# Patient Record
Sex: FEMALE
[De-identification: names, ages, dates, MRNs, and addresses within clinical notes are randomized per-mention and may not be internally consistent; named-entity substitution may affect disease eponyms.]

---

## 2018-11-20 ENCOUNTER — RECORD ABSTRACTING (OUTPATIENT)
Age: 68
End: 2018-11-20

## 2018-11-20 DIAGNOSIS — Z86.59 PERSONAL HISTORY OF OTHER MENTAL AND BEHAVIORAL DISORDERS: ICD-10-CM

## 2018-11-20 DIAGNOSIS — Z78.9 OTHER SPECIFIED HEALTH STATUS: ICD-10-CM

## 2018-11-20 DIAGNOSIS — K50.10 CROHN'S DISEASE OF LARGE INTESTINE W/OUT COMPLICATIONS: ICD-10-CM

## 2018-11-20 DIAGNOSIS — Z82.49 FAMILY HISTORY OF ISCHEMIC HEART DISEASE AND OTHER DISEASES OF THE CIRCULATORY SYSTEM: ICD-10-CM

## 2018-11-20 DIAGNOSIS — Z86.39 PERSONAL HISTORY OF OTHER ENDOCRINE, NUTRITIONAL AND METABOLIC DISEASE: ICD-10-CM

## 2018-11-20 DIAGNOSIS — Z87.891 PERSONAL HISTORY OF NICOTINE DEPENDENCE: ICD-10-CM

## 2018-11-20 PROBLEM — Z00.00 ENCOUNTER FOR PREVENTIVE HEALTH EXAMINATION: Status: ACTIVE | Noted: 2018-11-20

## 2018-12-03 ENCOUNTER — APPOINTMENT (OUTPATIENT)
Dept: GASTROENTEROLOGY | Facility: CLINIC | Age: 68
End: 2018-12-03
Payer: MEDICARE

## 2018-12-03 VITALS
SYSTOLIC BLOOD PRESSURE: 148 MMHG | WEIGHT: 120 LBS | BODY MASS INDEX: 20.49 KG/M2 | HEIGHT: 64 IN | HEART RATE: 70 BPM | DIASTOLIC BLOOD PRESSURE: 84 MMHG

## 2018-12-03 DIAGNOSIS — K50.90 CROHN'S DISEASE, UNSPECIFIED, W/OUT COMPLICATIONS: ICD-10-CM

## 2018-12-03 DIAGNOSIS — K60.3 ANAL FISTULA: ICD-10-CM

## 2018-12-03 DIAGNOSIS — K21.9 GASTRO-ESOPHAGEAL REFLUX DISEASE W/OUT ESOPHAGITIS: ICD-10-CM

## 2018-12-03 DIAGNOSIS — K59.00 CONSTIPATION, UNSPECIFIED: ICD-10-CM

## 2018-12-03 PROCEDURE — 99214 OFFICE O/P EST MOD 30 MIN: CPT

## 2018-12-18 ENCOUNTER — APPOINTMENT (OUTPATIENT)
Dept: GASTROENTEROLOGY | Facility: HOSPITAL | Age: 68
End: 2018-12-18

## 2018-12-20 ENCOUNTER — RX RENEWAL (OUTPATIENT)
Age: 68
End: 2018-12-20

## 2019-01-14 ENCOUNTER — RX CHANGE (OUTPATIENT)
Age: 69
End: 2019-01-14

## 2019-01-14 RX ORDER — MESALAMINE 1.2 G/1
1.2 TABLET, DELAYED RELEASE ORAL DAILY
Qty: 60 | Refills: 6 | Status: ACTIVE | COMMUNITY
Start: 2019-01-14 | End: 1900-01-01

## 2019-01-15 ENCOUNTER — RX RENEWAL (OUTPATIENT)
Age: 69
End: 2019-01-15

## 2019-02-19 ENCOUNTER — MEDICATION RENEWAL (OUTPATIENT)
Age: 69
End: 2019-02-19

## 2019-06-13 NOTE — HISTORY OF PRESENT ILLNESS
[de-identified] :  The patient presents for follow up for Crohns disease. EGD / Colonoscopy 12/2018 notable for  a 2 cm HH / mild esophagitis / mild chronic inflammation from terminal ileum to rectum. Anal canal scarring evident secondary to numerous anal fistula surgeries.  Colonoscopy was 12/2016  and was notable for diverticulosis, internal / external hemorrhoids and a benign colon polyp. The biopsies of normal appearing mucosa revealed mild crypt abscess. Course has been complicated by anal fistulas. Last surgery for fistula was in 2016. Additionally c/o long standing reflux ( ~ 2-3 times weekly). denies nausea, vomiting, fever, chills, diarrhea, melena, hematemesis. Describes chronic constipation despite water and fiber daily. alleviated with Miralax Diagnosed at age 14 by Dr. Coleman...apparently quite ill between age 14 and 18....no GI follow up between 18 and 32...Saw various GIs in 30's and 40s. Had colonoscopy by Dr. Matta in 1995 . Colonoscopy in 2014 notable for hemorrhoids and mild chronic inflammarory changes. Colonoscopy in 2012 was similar except for more active inflammatory changers in rectum associated with fistula tract. Had " fistula surgery" in 2012 at [facility] ( Dr. Gates). Had a similar surgery earlier that year by Dr. Jose. Has had no office f/u since 2014 colonoscopy. Maintained on mesalamine ( Delzicol or asacol HD or Lialda ) . The patient  initially presented to me with bloody diarrhea, tenesmus and a serosanquinous discharge from her rectum / fistula. Colonoscopy by me on 3/12/12 revealed an endoscopically normal terminal ileum, ascending, transverse and descending colon. Erythema , stricture and ulceration was noted starting ~ 30 cm extending to rectum. Biopsies revealed mild colitis throughout with marked inflamation, ulceration and crypt abscesses in rectum. Perirectal fistula tracts were also noted. She was started on Lialda 2 tabs daily, canasa supposities nightly and flagyl 250-500 TID. Surgery was performed by Dr. Jose on 3/23/12 (incision and drainage of perirectal abscess/fistula, dilation of stricture, biopsy of fistula, placement and subsequent removal of Setons). . \par

## 2019-06-13 NOTE — PHYSICAL EXAM
[General Appearance - Alert] : alert [Outer Ear] : the ears and nose were normal in appearance [Sclera] : the sclera and conjunctiva were normal [] : no respiratory distress [Apical Impulse] : the apical impulse was normal [Bowel Sounds] : normal bowel sounds [Abdomen Soft] : soft [FreeTextEntry1] : deferred until upcoming colonoscopy [No CVA Tenderness] : no ~M costovertebral angle tenderness [Abnormal Walk] : normal gait [Oriented To Time, Place, And Person] : oriented to person, place, and time [Cranial Nerves] : cranial nerves 2-12 were intact

## 2019-06-13 NOTE — ASSESSMENT
[FreeTextEntry1] : 1. Crohns: Continue Asacol HD 800mg BID. Colonoscopy planned with repeat colonoscopy every 1-2 years\par \par 2. GERD: Dietary and lifestyle modification. EGD to exclude Barretts\par \par 3. Constipation: Continue Miralax / water / fiber\par \par 4. Anal fistula: Has not required surgical intervention since 2016

## 2019-06-14 ENCOUNTER — APPOINTMENT (OUTPATIENT)
Dept: GASTROENTEROLOGY | Facility: CLINIC | Age: 69
End: 2019-06-14

## 2020-11-17 RX ORDER — MESALAMINE 1.2 G/1
1.2 TABLET, DELAYED RELEASE ORAL DAILY
Qty: 60 | Refills: 6 | Status: ACTIVE | COMMUNITY
Start: 2019-01-15 | End: 1900-01-01

## 2021-07-09 DIAGNOSIS — R19.7 DIARRHEA, UNSPECIFIED: ICD-10-CM
